# Patient Record
Sex: FEMALE | Race: WHITE | NOT HISPANIC OR LATINO | Employment: FULL TIME | URBAN - METROPOLITAN AREA
[De-identification: names, ages, dates, MRNs, and addresses within clinical notes are randomized per-mention and may not be internally consistent; named-entity substitution may affect disease eponyms.]

---

## 2024-01-30 ENCOUNTER — OFFICE VISIT (OUTPATIENT)
Dept: FAMILY MEDICINE CLINIC | Facility: CLINIC | Age: 44
End: 2024-01-30
Payer: COMMERCIAL

## 2024-01-30 VITALS
OXYGEN SATURATION: 94 % | DIASTOLIC BLOOD PRESSURE: 86 MMHG | RESPIRATION RATE: 16 BRPM | WEIGHT: 168.2 LBS | SYSTOLIC BLOOD PRESSURE: 128 MMHG | HEIGHT: 67 IN | HEART RATE: 99 BPM | BODY MASS INDEX: 26.4 KG/M2

## 2024-01-30 DIAGNOSIS — F41.1 GENERALIZED ANXIETY DISORDER: ICD-10-CM

## 2024-01-30 DIAGNOSIS — Z00.00 ENCOUNTER FOR ANNUAL PHYSICAL EXAM: Primary | ICD-10-CM

## 2024-01-30 RX ORDER — SERTRALINE HYDROCHLORIDE 100 MG/1
100 TABLET, FILM COATED ORAL DAILY
Qty: 90 TABLET | Refills: 3 | Status: SHIPPED | OUTPATIENT
Start: 2024-01-30

## 2024-01-30 NOTE — PROGRESS NOTES
[facility]   New Patient Visit  01/30/2024    Patient Name: Gail Velasco   YOB: 1980   Medical Record Number: [id number]   Primary Care Physician: Asia Chandler MD     Subjective     Chief Complaint     Chief Complaint   Patient presents with    [facility] [place] [facility]       History of Present Illness   Gail Velasco is a 44 y.o. female who presents to clinic for annual physical. She was previously a patient of Dr. Toledo and is here to establish with me as her new PCP. She was last seen at this office in Oct 2020.       Chronic Conditions/Other Concerns   Anxiety: Currently on sertraline 100 mg daily, working well to control symptoms, no concerns today. Needs refill.     Healthcare Maintenance   Diet: healthy eating habits and balanced diet  Exercise: generally stays active (work/exercise)  Mood: Denies depression symptoms (PHQ-2 screen is negative)  Vision/Dental Health: UTD, no concerns  Social Hx:  reports that she has never smoked. She has never used smokeless tobacco. She reports current alcohol use. She reports that she does not currently use drugs.  Follows with Dr. Pandya, GYN: Pap last done in Fall 2023 and normal  Menstrual Status: premenopausal  Menstrual Cycles: regular, normal duration, and normal flow  Sexual History:  reports being sexually active.  Contraception: oral contraceptives (estrogen/progesterone)  Gets annual labs through [facility] for the past several years, had them recently before insurance switched, completed at [facility]. Everything was normal except for triglycerides were a little high.     Review of Systems   A medically appropriate and patient-specific review of systems was performed. Pertinent findings are mentioned in the HPI, with no additional significant findings beyond those already noted.      Patient History   The following portions of the patient's history were reviewed and updated as appropriate:  "  allergies, current medications, problem list, PMHx, PSHx, PFHx, past social history    Objective     Vitals:    01/30/24 1322   BP: 128/86   Pulse: 99   Resp: 16   SpO2: 94%   Weight: 76.3 kg (168 lb 3.2 oz)   Height: 170.2 cm (67\")      BMI Readings from Last 3 Encounters:   01/30/24 26.34 kg/m²   12/11/21 24.28 kg/m²   10/13/20 25.06 kg/m²     BMI is >= 25 and <30. (Overweight) The following options were offered after discussion;: exercise counseling/recommendations and nutrition counseling/recommendations    Physical Exam   Constitutional: Alert, well-appearing, no acute distress  Eyes: Vision grossly intact, sclerae anicteric, no conjunctival injection  HENT: NCAT, mucous membranes moist, normal dentition  Neck: Supple, trachea midline  Respiratory: No respiratory distress, good effort and air entry, clear to auscultation bilaterally   Cardiovascular: RRR, no murmurs, normal peripheral perfusion, no LE edema  Musculoskeletal: Strength grossly intact, appropriate ROM in all extremities, no obvious deformities or injuries  Psychiatric: Appropriate mood and affect, cooperative  Neurologic: At baseline, motor and sensory function grossly intact, moves all extremities equally  Skin: No apparent rashes or lesions    Assessment & Plan     Diagnoses and all orders for this visit:    Encounter for annual physical exam  Comments:  Immunizations UTD. Cancer screenings UTD. Annual labs completed through [facility]; last in December 2023 and appropriate. Counseled patient on diet, exercise, weight, vaccines, disease/screening prevention, safety, risk reduction, dental/vision care, and mental health.     Generalized anxiety disorder  Comments:  Chronic, stable  Continue sertraline 100 mg daily. Refilled today.  -     sertraline (ZOLOFT) 100 MG tablet; Take 1 tablet by mouth Daily.      In addition to the above, I also completed the following during today's visit: Educated patient and/or family on overall impression and " recommended plan of care, patient encouraged to voice questions and all questions were answered, reviewed appropriate use of current medications and reiterated important side effects to be aware of, reviewed return precautions and reasons to seek urgent/emergent care      Follow Up   Return in about 1 year (around 1/30/2025) for Annual physical.  Patient was given instructions and counseling regarding her condition or for health maintenance advice. Please see specific information pulled into the AVS if appropriate.           Asia Chandler MD

## 2024-11-14 RX ORDER — SERTRALINE HYDROCHLORIDE 100 MG/1
100 TABLET, FILM COATED ORAL DAILY
Qty: 90 TABLET | Refills: 3 | Status: SHIPPED | OUTPATIENT
Start: 2024-11-14

## 2025-02-03 ENCOUNTER — OFFICE VISIT (OUTPATIENT)
Dept: FAMILY MEDICINE CLINIC | Facility: CLINIC | Age: 45
End: 2025-02-03
Payer: COMMERCIAL

## 2025-02-03 ENCOUNTER — LAB (OUTPATIENT)
Dept: FAMILY MEDICINE CLINIC | Facility: CLINIC | Age: 45
End: 2025-02-03
Payer: COMMERCIAL

## 2025-02-03 VITALS
HEIGHT: 67 IN | BODY MASS INDEX: 26.55 KG/M2 | HEART RATE: 90 BPM | RESPIRATION RATE: 16 BRPM | DIASTOLIC BLOOD PRESSURE: 68 MMHG | OXYGEN SATURATION: 96 % | WEIGHT: 169.13 LBS | SYSTOLIC BLOOD PRESSURE: 110 MMHG

## 2025-02-03 DIAGNOSIS — F41.1 GENERALIZED ANXIETY DISORDER: ICD-10-CM

## 2025-02-03 DIAGNOSIS — Z12.11 SCREENING FOR COLON CANCER: ICD-10-CM

## 2025-02-03 DIAGNOSIS — I05.9 MITRAL VALVE DISORDER: ICD-10-CM

## 2025-02-03 DIAGNOSIS — Z00.00 ENCOUNTER FOR ANNUAL PHYSICAL EXAM: ICD-10-CM

## 2025-02-03 DIAGNOSIS — Z00.00 ENCOUNTER FOR ANNUAL PHYSICAL EXAM: Primary | ICD-10-CM

## 2025-02-03 LAB — TSH SERPL DL<=0.05 MIU/L-ACNC: 3.32 UIU/ML (ref 0.27–4.2)

## 2025-02-03 PROCEDURE — 85025 COMPLETE CBC W/AUTO DIFF WBC: CPT | Performed by: STUDENT IN AN ORGANIZED HEALTH CARE EDUCATION/TRAINING PROGRAM

## 2025-02-03 PROCEDURE — 83036 HEMOGLOBIN GLYCOSYLATED A1C: CPT | Performed by: STUDENT IN AN ORGANIZED HEALTH CARE EDUCATION/TRAINING PROGRAM

## 2025-02-03 PROCEDURE — 80053 COMPREHEN METABOLIC PANEL: CPT | Performed by: STUDENT IN AN ORGANIZED HEALTH CARE EDUCATION/TRAINING PROGRAM

## 2025-02-03 PROCEDURE — 80061 LIPID PANEL: CPT | Performed by: STUDENT IN AN ORGANIZED HEALTH CARE EDUCATION/TRAINING PROGRAM

## 2025-02-03 PROCEDURE — 36415 COLL VENOUS BLD VENIPUNCTURE: CPT

## 2025-02-03 PROCEDURE — 84443 ASSAY THYROID STIM HORMONE: CPT | Performed by: STUDENT IN AN ORGANIZED HEALTH CARE EDUCATION/TRAINING PROGRAM

## 2025-02-03 RX ORDER — SERTRALINE HYDROCHLORIDE 100 MG/1
100 TABLET, FILM COATED ORAL DAILY
Qty: 90 TABLET | Refills: 3 | Status: SHIPPED | OUTPATIENT
Start: 2025-02-03

## 2025-02-04 LAB
ALBUMIN SERPL-MCNC: 3.9 G/DL (ref 3.5–5.2)
ALBUMIN/GLOB SERPL: 1.2 G/DL
ALP SERPL-CCNC: 57 U/L (ref 39–117)
ALT SERPL W P-5'-P-CCNC: 14 U/L (ref 1–33)
ANION GAP SERPL CALCULATED.3IONS-SCNC: 8.7 MMOL/L (ref 5–15)
AST SERPL-CCNC: 18 U/L (ref 1–32)
BASOPHILS # BLD AUTO: 0.02 10*3/MM3 (ref 0–0.2)
BASOPHILS NFR BLD AUTO: 0.4 % (ref 0–1.5)
BILIRUB SERPL-MCNC: 0.4 MG/DL (ref 0–1.2)
BUN SERPL-MCNC: 11 MG/DL (ref 6–20)
BUN/CREAT SERPL: 15.5 (ref 7–25)
CALCIUM SPEC-SCNC: 9.4 MG/DL (ref 8.6–10.5)
CHLORIDE SERPL-SCNC: 100 MMOL/L (ref 98–107)
CHOLEST SERPL-MCNC: 193 MG/DL (ref 0–200)
CO2 SERPL-SCNC: 27.3 MMOL/L (ref 22–29)
CREAT SERPL-MCNC: 0.71 MG/DL (ref 0.57–1)
DEPRECATED RDW RBC AUTO: 39.8 FL (ref 37–54)
EGFRCR SERPLBLD CKD-EPI 2021: 107 ML/MIN/1.73
EOSINOPHIL # BLD AUTO: 0.13 10*3/MM3 (ref 0–0.4)
EOSINOPHIL NFR BLD AUTO: 2.4 % (ref 0.3–6.2)
ERYTHROCYTE [DISTWIDTH] IN BLOOD BY AUTOMATED COUNT: 12.3 % (ref 12.3–15.4)
GLOBULIN UR ELPH-MCNC: 3.3 GM/DL
GLUCOSE SERPL-MCNC: 80 MG/DL (ref 65–99)
HBA1C MFR BLD: 4.7 % (ref 4.8–5.6)
HCT VFR BLD AUTO: 38.2 % (ref 34–46.6)
HDLC SERPL-MCNC: 53 MG/DL (ref 40–60)
HGB BLD-MCNC: 12.4 G/DL (ref 12–15.9)
IMM GRANULOCYTES # BLD AUTO: 0.02 10*3/MM3 (ref 0–0.05)
IMM GRANULOCYTES NFR BLD AUTO: 0.4 % (ref 0–0.5)
LDLC SERPL CALC-MCNC: 107 MG/DL (ref 0–100)
LDLC/HDLC SERPL: 1.92 {RATIO}
LYMPHOCYTES # BLD AUTO: 1.42 10*3/MM3 (ref 0.7–3.1)
LYMPHOCYTES NFR BLD AUTO: 25.7 % (ref 19.6–45.3)
MCH RBC QN AUTO: 28.7 PG (ref 26.6–33)
MCHC RBC AUTO-ENTMCNC: 32.5 G/DL (ref 31.5–35.7)
MCV RBC AUTO: 88.4 FL (ref 79–97)
MONOCYTES # BLD AUTO: 0.41 10*3/MM3 (ref 0.1–0.9)
MONOCYTES NFR BLD AUTO: 7.4 % (ref 5–12)
NEUTROPHILS NFR BLD AUTO: 3.53 10*3/MM3 (ref 1.7–7)
NEUTROPHILS NFR BLD AUTO: 63.7 % (ref 42.7–76)
NRBC BLD AUTO-RTO: 0 /100 WBC (ref 0–0.2)
PLATELET # BLD AUTO: 170 10*3/MM3 (ref 140–450)
PMV BLD AUTO: 12 FL (ref 6–12)
POTASSIUM SERPL-SCNC: 4.5 MMOL/L (ref 3.5–5.2)
PROT SERPL-MCNC: 7.2 G/DL (ref 6–8.5)
RBC # BLD AUTO: 4.32 10*6/MM3 (ref 3.77–5.28)
SODIUM SERPL-SCNC: 136 MMOL/L (ref 136–145)
TRIGL SERPL-MCNC: 191 MG/DL (ref 0–150)
VLDLC SERPL-MCNC: 33 MG/DL (ref 5–40)
WBC NRBC COR # BLD AUTO: 5.53 10*3/MM3 (ref 3.4–10.8)

## 2025-02-24 NOTE — PROGRESS NOTES
[facility] [place] [facility]   02/03/2025    Patient Name: Gail Velasco   YOB: 1980   Age/Sex: 45 y.o. female   Medical Record Number: [id number]   Primary Care Physician: Asia Chandler MD     Subjective     Chief Complaint     Chief Complaint   Patient presents with    Annual Exam       History of Present Illness     History of Present Illness  The patient presents for evaluation of mitral valve prolapse, hypothyroidism, depression, and health maintenance.    Mitral Valve Prolapse  - Known diagnosis of mitral valve prolapse  - Occasionally manifests as unusual sensations  - Reports no associated symptoms such as lightheadedness  - Blood pressure readings have been lower than usual during her last two gynecological visits    Depression  - Currently on Zoloft  - Reports Zoloft is managing her symptoms effectively    Health Maintenance  - Completed Pap smear and mammogram screenings, with the latter conducted on 01/17/2025  - Expressed interest in undergoing Cologuard testing  - No family history of colon cancer  - Has not received any radiation therapy  - Reports no concerning symptoms at present  - Dietary habits include a high intake of carbohydrates, but does not consume excessive amounts of meat  - Experiences weight fluctuations, typically gaining weight during the [place] season and losing approximately 20 pounds by March through healthier eating habits  - Currently using birth control and continues to menstruate regularly, although with lighter flow    Hypothyroidism  - Family history of thyroid issues  - Undergoes annual laboratory testing as part of her insurance requirements  - Most recent lab results indicated elevated triglycerides, but all other parameters were within normal limits    FAMILY HISTORY  - Family history of thyroid issues  - No family history of colon cancer    MEDICATIONS  - Zoloft        Review of Systems   A medically appropriate and  "patient-specific review of systems was performed. Pertinent findings are mentioned in the HPI, with no additional significant findings beyond those already noted.      Patient History   The following portions of the patient's history were reviewed and updated as appropriate:   allergies, current medications, problem list, PMHx, PSHx, PFHx, past social history    Objective     Vitals:    02/03/25 1314   BP: 110/68   Pulse: 90   Resp: 16   SpO2: 96%   Weight: 76.7 kg (169 lb 2 oz)   Height: 170.2 cm (67\")        Physical Exam   Constitutional: Alert, well-appearing, no acute distress  HENT: NCAT, mucous membranes moist  Neck: Supple, no thyromegaly  Respiratory: No respiratory distress, good effort and air entry, clear to auscultation bilaterally   Cardiovascular: RRR, no LE edema  Psychiatric: Appropriate mood and affect, cooperative  Skin: No apparent rashes or lesions          Assessment & Plan        Encounter for annual physical exam  Age appropriate cancer and preventative screenings were reviewed and ordered as indicated. Immunizations were reviewed and education/instructions were provided on any recommended vaccines that are currenlty due. Routine labs ordered for monitoring and screening as indicated. Counseled patient on diet, exercise, weight, vaccines, disease/screening prevention, safety, risk reduction, dental/vision care, and mental health. Addressed all patient/family questions.  Orders:    Lipid Panel; Future    Hemoglobin A1c; Future    Comprehensive Metabolic Panel; Future    CBC & Differential; Future    TSH Rfx On Abnormal To Free T4; Future    Screening for colon cancer    Orders:    Cologuard - Stool, Per Rectum; Future    Generalized anxiety disorder  Chronic, stable  Continue sertraline 100 mg daily         Mitral valve disorder  Her blood pressure readings have been consistently within the normal range. She reports no symptoms such as lightheadedness or significant issues related to her " mitral valve prolapse.  PLAN:  - An ultrasound of the heart will be considered if symptoms develop or become more pronounced.              Follow Up   Return in about 1 year (around 2/3/2026) for Annual physical.        This medical documentation was produced in part using speech recognition software (Dragon Dictation System) and may contain errors related to that system including errors in grammar, punctuation, and spelling, as well as words and phrases that may be inappropriate and not intentional --- If there are any questions or concerns please feel free to contact me, the dictating provider, for clarification.      Patient or patient representative verbalized consent for the use of Ambient Listening during the visit with  Asia Chandler MD for chart documentation.    Asia Chandler MD

## 2025-02-24 NOTE — ASSESSMENT & PLAN NOTE
Her blood pressure readings have been consistently within the normal range. She reports no symptoms such as lightheadedness or significant issues related to her mitral valve prolapse.  PLAN:  - An ultrasound of the heart will be considered if symptoms develop or become more pronounced.